# Patient Record
(demographics unavailable — no encounter records)

---

## 2025-03-31 NOTE — ASSESSMENT
[FreeTextEntry1] : Right dorsal hand mass/midcarpal dislocation and right thumb trigger - reviewed radiographs and pathoanatomy with patient. In light of appreciable dorsal hand mass, will obtain right wrist CT without contrast and will followup thereafter to discuss results and develop plan of care.  F/u after CT

## 2025-03-31 NOTE — HISTORY OF PRESENT ILLNESS
[de-identified] : 40M, RHD, No PMHX presents with right hand muscle protrusion for a couple of months. Denies numbness/tingling. Denies pain, denies injury/trauma. He admits to some pain with writing and some use. Denies outside imaging/treatment.   03/31/25: f/u right hand. Patient reports that he is still having issues with his right hand, stating that it is painful and profusely stiff in the morning. Patient reports that he had tried to get an MRI but was unable to get a clear reading due to his right hand shaking too much. Denies numbness/tingling. Only reports a pulling sensation in the right hand.

## 2025-03-31 NOTE — IMAGING
[de-identified] : Right wrist/hand with 4+ cm dorsal protrusion at level of 1st and 2nd CMC. -ttp. No erythema nor drainage. Able to make fist, oppose thumb to small finger and abduct fingers. Sensation intact throughout. <2sec cap refill.\par  \par  Right wrist radiographs with thumb CMC arthrosis (post-traumatic). No acute fracture nor dislocation.

## 2025-04-18 NOTE — ASSESSMENT
[FreeTextEntry1] : Right wrist dorsal synovial chrondromatosis - reviewed CT, radiographs and pathoanatomy with patient. Can excise.   Right thumb trigger - Discussed with patient the pathoanatomy of trigger and options going forward. Risks/benefits discussed as well as natural progression that injection will provide some relief but symptoms may recur. Discussed that pain may worsen in coming days but will subside. Discussed that we can repeat an injection or that operative intervention may be indicated down the line. After discussion of risks/benefits, including glucose intolerance in the diabetic population, patient elected to proceed with CSI to the A1 pulley. ***Procedure 1 - 0.5cc 1% lidocaine + 0.5cc 40mg/cc Kenalog administered to the right thumb A1 pulley following sterilization with Betadine. Patient tolerated this well.   F/u 3months

## 2025-04-18 NOTE — IMAGING
[de-identified] : Right wrist/hand with 4+ cm dorsal protrusion at level of 1st and 2nd CMC. -ttp. No erythema nor drainage. Able to make fist, oppose thumb to small finger and abduct fingers. Sensation intact throughout. <2sec cap refill. +ttp at thumb A1 pulley.  Right wrist radiographs with thumb CMC arthrosis (post-traumatic). No acute fracture nor dislocation. Right wrist CT with extensive dorsal synovial chondromatosis.

## 2025-04-18 NOTE — HISTORY OF PRESENT ILLNESS
[de-identified] : 40M, RHD, No PMHX presents with right hand muscle protrusion for a couple of months. Denies numbness/tingling. Denies pain, denies injury/trauma. He admits to some pain with writing and some use. Denies outside imaging/treatment.   03/31/25: f/u right hand. Patient reports that he is still having issues with his right hand, stating that it is painful and profusely stiff in the morning. Patient reports that he had tried to get an MRI but was unable to get a clear reading due to his right hand shaking too much. Denies numbness/tingling. Only reports a pulling sensation in the right hand.   04/18/25: f/u right hand. Patient reports that his symptoms are largely the same as his previous visit. Patient states that he would like a CSI in his right thumb today.

## 2025-05-03 NOTE — IMAGING
[de-identified] : Right wrist/hand with 4+ cm dorsal protrusion at level of 1st and 2nd CMC. -ttp. No erythema nor drainage. Able to make fist, oppose thumb to small finger and abduct fingers. Sensation intact throughout. <2sec cap refill. +ttp at thumb A1 pulley.  Right wrist radiographs with thumb CMC arthrosis (post-traumatic). No acute fracture nor dislocation. Right wrist CT with extensive dorsal synovial chondromatosis.

## 2025-05-03 NOTE — HISTORY OF PRESENT ILLNESS
[de-identified] : 40M, RHD, No PMHX presents with right hand muscle protrusion for a couple of months. Denies numbness/tingling. Denies pain, denies injury/trauma. He admits to some pain with writing and some use. Denies outside imaging/treatment.   03/31/25: f/u right hand. Patient reports that he is still having issues with his right hand, stating that it is painful and profusely stiff in the morning. Patient reports that he had tried to get an MRI but was unable to get a clear reading due to his right hand shaking too much. Denies numbness/tingling. Only reports a pulling sensation in the right hand.   04/18/25: f/u right hand. Patient reports that his symptoms are largely the same as his previous visit. Patient states that he would like a CSI in his right thumb today.   5/2/25: Patient reports dec in pain of right thumb from CSI, but still c/o pain during prolonged gripping activity. Reports clicking of DIP that causes pain as well.

## 2025-05-03 NOTE — ASSESSMENT
[FreeTextEntry1] : Right wrist dorsal synovial chrondromatosis - reviewed CT, radiographs and pathoanatomy with patient. Can excise. Came to mutual decision to proceed with nonoperative management/observation at this time.  Right thumb trigger - Discussed with patient the pathoanatomy of trigger and options going forward. Risks/benefits discussed as well as natural progression that injection will provide some relief but symptoms may recur. Discussed that pain may worsen in coming days but will subside. Discussed that we can repeat an injection or that operative intervention may be indicated down the line.   F/u 3months

## 2025-05-03 NOTE — HISTORY OF PRESENT ILLNESS
[de-identified] : 40M, RHD, No PMHX presents with right hand muscle protrusion for a couple of months. Denies numbness/tingling. Denies pain, denies injury/trauma. He admits to some pain with writing and some use. Denies outside imaging/treatment.   03/31/25: f/u right hand. Patient reports that he is still having issues with his right hand, stating that it is painful and profusely stiff in the morning. Patient reports that he had tried to get an MRI but was unable to get a clear reading due to his right hand shaking too much. Denies numbness/tingling. Only reports a pulling sensation in the right hand.   04/18/25: f/u right hand. Patient reports that his symptoms are largely the same as his previous visit. Patient states that he would like a CSI in his right thumb today.   5/2/25: Patient reports dec in pain of right thumb from CSI, but still c/o pain during prolonged gripping activity. Reports clicking of DIP that causes pain as well.

## 2025-05-03 NOTE — IMAGING
[de-identified] : Right wrist/hand with 4+ cm dorsal protrusion at level of 1st and 2nd CMC. -ttp. No erythema nor drainage. Able to make fist, oppose thumb to small finger and abduct fingers. Sensation intact throughout. <2sec cap refill. +ttp at thumb A1 pulley.  Right wrist radiographs with thumb CMC arthrosis (post-traumatic). No acute fracture nor dislocation. Right wrist CT with extensive dorsal synovial chondromatosis.

## 2025-05-08 NOTE — ASSESSMENT
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  Patient reports pain is tolerable at this time.  Explained to patient that hardware has been stable compared to xrays from 2023.  Recommend nonsurgical management.  -explained to patient that if his pain worsens in the future that he may be a candidate for ankle fusion vs TAA -Activities as tolerated -Rest, ice, compression, elevation, NSAIDs PRN for pain. -All questions answered -F/u   The diagnosis was explained in detail. The potential non-surgical and surgical treatments were reviewed. The relative risks and benefits of each option were considered relative to the patients age, activity level, medical history, symptom severity and previously attempted treatments.   The patient was advised to consult with their primary medical provider prior to initiation of any new medications to reduce the risk of adverse effects specific to their long-term home medications and medical history.    The patient's current medications management of their orthopedic diagnosis was reviewed today:   1) We discussed a comprehensive treatment plan that included possible pharmaceutical management involving the use of prescription strength medications including but not limited to options as oral Naprosyn 500mg BID, once daily Meloxicam 15 mg, or 500-650 mg Tylenol versus over the counter oral medications and topical prescriptions NSAID Pennsaid vs over the counter Voltaren gel.  2) There is a moderate risk of morbidity with further treatment, especially from use of prescription strength medications and possible side effects of these medications which include upset stomach with oral medications, skin reactions to topical medications and cardiac/renal issues with long term use.  3) I recommended that hte patient follow-up with their medical physician to discuss any significant specific potential issues with long term medication use such as interactions with current medications or with exacerbation of underlying medical comorbidities.  4) The benefits and risks associated with use of injectable, oral or topical, prescription and over the counter anti-inflammatory medications were discussed with the patient. The patient voiced understanding of the risks including but not limited to bleeding, stroke, kidney dysfunction, heart disease, and were referred to the black box warning label for further information

## 2025-05-08 NOTE — PHYSICAL EXAM
[de-identified] : Examination of the right foot and ankle is as follows: INSPECTION: mild swelling, no ecchymosis, no abrasion, laceration, no erythema, old well healed incisions.  PALPATION: medial/anterior/lateral joint tenderness ROM: plantarflexion 40 degrees, inversion 30 degrees, eversion 20 degrees, dorsiflexion 10 degrees STRENGTH: dorsiflexion 5/5, plantar flexion 5/5, inversion 5/5, eversion 5/5, EHL 5/5, FHL 5/5 VASCULAR: dorsalis pedis pulse: 2+, posterior tibialis pulse: 2+ NEURO: Sensation present to light touch in all distributions GAIT: mildly antalgic, ambulation without assisted devices   X-rays of the right ankle is as follows: Ankle 3 view AP/Lateral/Oblique:: bimalleolar ankle plates and screws, no evidence of hardware failure compared to xrays from 2023, moderate to severe ankle joint DJD

## 2025-05-08 NOTE — HISTORY OF PRESENT ILLNESS
[Sudden] : sudden [Dull/Aching] : dull/aching [Throbbing] : throbbing [Household chores] : household chores [Leisure] : leisure [Social interactions] : social interactions [Rest] : rest [7] : 7 [5] : 5 [Sharp] : sharp [Stabbing] : stabbing [Constant] : constant [Disabled] : Work status: disabled [de-identified] : Patient here for right ankle. Patient states NKI. Patent states he had SX Dr. Valencia for a bimall FX in 10/4/2019 and another due to walking on his ankle 6/2021. Patient states pain started 3/2023. Patient states pain is sharp and stabbing with weight bearing and at rest. Patient came into office ambulating on his own in sneakers.  [] : Post Surgical Visit: no [FreeTextEntry1] : Right ankle  [de-identified] : Movement

## 2025-05-08 NOTE — PHYSICAL EXAM
[de-identified] : Examination of the right foot and ankle is as follows: INSPECTION: mild swelling, no ecchymosis, no abrasion, laceration, no erythema, old well healed incisions.  PALPATION: medial/anterior/lateral joint tenderness ROM: plantarflexion 40 degrees, inversion 30 degrees, eversion 20 degrees, dorsiflexion 10 degrees STRENGTH: dorsiflexion 5/5, plantar flexion 5/5, inversion 5/5, eversion 5/5, EHL 5/5, FHL 5/5 VASCULAR: dorsalis pedis pulse: 2+, posterior tibialis pulse: 2+ NEURO: Sensation present to light touch in all distributions GAIT: mildly antalgic, ambulation without assisted devices   X-rays of the right ankle is as follows: Ankle 3 view AP/Lateral/Oblique:: bimalleolar ankle plates and screws, no evidence of hardware failure compared to xrays from 2023, moderate to severe ankle joint DJD

## 2025-05-08 NOTE — HISTORY OF PRESENT ILLNESS
[Sudden] : sudden [Dull/Aching] : dull/aching [Throbbing] : throbbing [Household chores] : household chores [Leisure] : leisure [Social interactions] : social interactions [Rest] : rest [7] : 7 [5] : 5 [Sharp] : sharp [Stabbing] : stabbing [Constant] : constant [Disabled] : Work status: disabled [de-identified] : Patient here for right ankle. Patient states NKI. Patent states he had SX Dr. Valencia for a bimall FX in 10/4/2019 and another due to walking on his ankle 6/2021. Patient states pain started 3/2023. Patient states pain is sharp and stabbing with weight bearing and at rest. Patient came into office ambulating on his own in sneakers.  [] : Post Surgical Visit: no [FreeTextEntry1] : Right ankle  [de-identified] : Movement
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
## 2025-05-12 NOTE — DISCUSSION/SUMMARY
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 The potential non-surgical and surgical treatments were reviewed. The relative risks and benefits of each option were considered relative to the patient's age, activity level, medical history, symptom severity and previously attempted treatments.   -We discussed the complex nature of the patient's problem. -I requested that the patient obtain his operative reports from [facility] to better understand the patient's index injury and surgery.  -Recommend image-guided aspiration with cell count and culture, held 14 days for C. acnes.  -Recommend repeat EMG/NCS to evaluate for peripheral nerve injury versus cervical radiculopathy. We discussed that if he has an axillary nerve or brachial plexus injury that he would not be a surgical candidate.  -Recommend CT scan without contrast to further define his bony deformity. -He may be a candidate for reverse shoulder replacement, pending further diagnostic information. In the setting of concern for infection we would perform a two stage with spacer followed by arthroplasty.  -Patient also will be seeking further care for his ankle from his index surgeon. If he needs ankle surgery, I recommended that he obtain it prior to shoulder intervention to allow for improved ability to comply with post-operative assistive devices. -Follow up with me in 6-8 weeks to review test results and medical records.  (MDM) Problem Complexity -Moderate: chronic illness with exacerbation   Risk -Low: over the counter medication   Visit Type -Established

## 2025-05-12 NOTE — IMAGING
[de-identified] : (Exam: Shoulder)   Laterality is left   Patient is in no acute distress, alert and oriented Sensation is grossly intact to light touch in the hand Motor function is 5/5 in the hand Capillary refill is less than 2 seconds in the fingers Lymphadenopathy is not present Peripheral edema is not present   Skin is intact with healed deltopectoral incision Swelling is not present Atrophy is not present Scapular winging is not present Deformity of the AC joint is not present Deformity of the biceps is not present   Bicipital groove tenderness is present AC joint tenderness is not present Scapulothoracic tenderness is not present   Active forward elevation is 80 Passive forward elevation is 120 External rotation at the side is 20 Internal rotation behind the back is to the level of back pocket   Forward elevation strength is 4/5 External rotation strength at the side is 5/5 Internal rotation strength at the side is 5/5 Deltoid strength of anterior, posterior and lateral heads is 5/5   Dewitt test is abnormal OBriens test is abnormal Empty can test is abnormal Speeds test is abnormal Cross body adduction test is normal Belly press test is normal Apprehension and relocation is normal Sulcus sign is normal [Straightening consistent with spasm] : Straightening consistent with spasm [Disc space narrowing] : Disc space narrowing [Left] : left shoulder [FreeTextEntry1] : four part proximal humerus fracture with post-traumatic osteoarthritis, malunion

## 2025-05-19 NOTE — PHYSICAL EXAM
[Right] : right hip with pelvis [Components well fixed, in good position] : Components well fixed, in good position [de-identified] : Constitutional: The patient appears well developed, well nourished.   Neurologic: Coordination is normal. Alert and oriented to time, place and person. No evidence of mood disorder, calm affect.   RIGHT      HIP/THIGH:  THERE IS TTP GREATER TROCH RIGHT HIP Palpation of the hip/thigh is as follows: Thigh/calf soft NT   Range of motion of the hip is as follows in degrees:  Flexion:  100  Abduction:  20 External rotation:  45  Strength testing of the hip/thigh is as follows:  Hip flexion strength:   5/5 Hip extension strength:  5/5  Hip abduction strength:  5/5   Hip adduction strength:  5/5  Neurological testing of the hip/thigh is as follows: motor exam 5/5 throughout, light touch intact throughout and no focal motor defecits.     Vascularity of the hip/thigh is as follows: Dorsalis pedis 2+ and Posterior tibial 2+    [FreeTextEntry9] : HO NOTED RIGHT HIP

## 2025-05-19 NOTE — HISTORY OF PRESENT ILLNESS
[de-identified] : 05/19/2025  CHU HARTMANN 42 year y/o M presents today for follow up right hip pain. R CM 5/14/2024. LEFT CM 8/6/22 Treatments since last visit: PT once a week.  Changes Since Last Visit: Patient reports progressing well.  He states he has noted intermittent hip pain lateral aspect.  Sometimes in damp weather.  He notes pain sometimes into the groin as well when he "overdoes it"  9/23/2024: Follow up for the right hip. Patient is s/p R CM 5/14/2024.

## 2025-05-19 NOTE — DISCUSSION/SUMMARY
[de-identified] : At this time the patient is progressing well. Patient demonstrates improvements in both range of motion and strength. Took time to discuss with patient the importance of maintaining ROM and Strength through daily HEP. Patient expressed understanding of this and will continue HEP.  At this time the patient can continue PT as needed. Discussed importance of patient continuing the exercises on their own as well.   Patient was instructed to take antibiotic prophylaxis prior to any dental or GI procedures.   Patient will f/u in 2 years  Entered by Sheldon Cabral acting as scribe. Dr. Grant Attestation The documentation recorded by the scribe, in my presence, accurately reflects the service I, Dr. Grant, personally performed, and the decisions made by me with my edits as appropriate.